# Patient Record
Sex: MALE | URBAN - METROPOLITAN AREA
[De-identification: names, ages, dates, MRNs, and addresses within clinical notes are randomized per-mention and may not be internally consistent; named-entity substitution may affect disease eponyms.]

---

## 2018-02-13 ENCOUNTER — APPOINTMENT (RX ONLY)
Dept: URBAN - METROPOLITAN AREA CLINIC 38 | Facility: CLINIC | Age: 63
Setting detail: DERMATOLOGY
End: 2018-02-13

## 2018-02-13 DIAGNOSIS — L82.1 OTHER SEBORRHEIC KERATOSIS: ICD-10-CM

## 2018-02-13 PROBLEM — H91.90 UNSPECIFIED HEARING LOSS, UNSPECIFIED EAR: Status: ACTIVE | Noted: 2018-02-13

## 2018-02-13 PROBLEM — F41.9 ANXIETY DISORDER, UNSPECIFIED: Status: ACTIVE | Noted: 2018-02-13

## 2018-02-13 PROBLEM — I10 ESSENTIAL (PRIMARY) HYPERTENSION: Status: ACTIVE | Noted: 2018-02-13

## 2018-02-13 PROBLEM — J44.9 CHRONIC OBSTRUCTIVE PULMONARY DISEASE, UNSPECIFIED: Status: ACTIVE | Noted: 2018-02-13

## 2018-02-13 PROBLEM — F32.9 MAJOR DEPRESSIVE DISORDER, SINGLE EPISODE, UNSPECIFIED: Status: ACTIVE | Noted: 2018-02-13

## 2018-02-13 PROBLEM — K21.9 GASTRO-ESOPHAGEAL REFLUX DISEASE WITHOUT ESOPHAGITIS: Status: ACTIVE | Noted: 2018-02-13

## 2018-02-13 PROCEDURE — 99202 OFFICE O/P NEW SF 15 MIN: CPT

## 2018-02-13 NOTE — HPI: SKIN LESION
How Severe Is Your Skin Lesion?: mild
Has Your Skin Lesion Been Treated?: not been treated
Is This A New Presentation, Or A Follow-Up?: Skin Lesion
Additional History: He put Neosporin and a bandaid on it.

## 2018-07-06 ENCOUNTER — NEW PATIENT (OUTPATIENT)
Age: 63
End: 2018-07-06

## 2018-07-06 DIAGNOSIS — B00.52: ICD-10-CM

## 2018-07-06 DIAGNOSIS — H53.032: ICD-10-CM

## 2018-07-06 DIAGNOSIS — Z98.890: ICD-10-CM

## 2018-07-06 DIAGNOSIS — H25.13: ICD-10-CM

## 2018-07-06 PROCEDURE — 99204 OFFICE O/P NEW MOD 45 MIN: CPT

## 2018-07-06 ASSESSMENT — TONOMETRY
OS_IOP_MMHG: 12
OD_IOP_MMHG: 11

## 2018-07-06 ASSESSMENT — KERATOMETRY
OS_K2POWER_DIOPTERS: 48.25
OD_AXISANGLE_DEGREES: 48
OD_AXISANGLE2_DEGREES: 138
OD_K1POWER_DIOPTERS: 42.50
OS_AXISANGLE_DEGREES: 178
OS_AXISANGLE2_DEGREES: 088
OD_K2POWER_DIOPTERS: 43.00
OS_K1POWER_DIOPTERS: 44.25

## 2018-07-06 ASSESSMENT — VISUAL ACUITY
OS_CC: 20/80+2
OD_CC: 20/100-1
OS_PH: 20/60-1
OD_PH: 20/50-1

## 2018-07-12 ENCOUNTER — DIAGNOSTICS ONLY (OUTPATIENT)
Age: 63
End: 2018-07-12

## 2018-07-12 DIAGNOSIS — H25.13: ICD-10-CM

## 2018-07-12 PROCEDURE — 92136 OPHTHALMIC BIOMETRY: CPT

## 2018-07-12 ASSESSMENT — KERATOMETRY
OS_AXISANGLE_DEGREES: 178
OS_AXISANGLE2_DEGREES: 088
OD_K2POWER_DIOPTERS: 43.00
OD_AXISANGLE2_DEGREES: 138
OS_K1POWER_DIOPTERS: 44.25
OD_K1POWER_DIOPTERS: 42.50
OD_AXISANGLE_DEGREES: 48
OS_K2POWER_DIOPTERS: 48.25

## 2018-08-13 ASSESSMENT — KERATOMETRY
OD_K1POWER_DIOPTERS: 42.50
OD_K2POWER_DIOPTERS: 43.00
OS_K2POWER_DIOPTERS: 48.25
OD_AXISANGLE_DEGREES: 48
OS_AXISANGLE2_DEGREES: 088
OS_K1POWER_DIOPTERS: 44.25
OD_AXISANGLE2_DEGREES: 138
OS_AXISANGLE_DEGREES: 178

## 2018-08-14 ENCOUNTER — SURGERY/PROCEDURE (OUTPATIENT)
Age: 63
End: 2018-08-14

## 2018-08-14 DIAGNOSIS — H25.812: ICD-10-CM

## 2018-08-14 PROCEDURE — 66984 XCAPSL CTRC RMVL W/O ECP: CPT

## 2018-08-15 ENCOUNTER — POST-OP CHECK (OUTPATIENT)
Age: 63
End: 2018-08-15

## 2018-08-15 DIAGNOSIS — Z96.1: ICD-10-CM

## 2018-08-15 PROCEDURE — 99024 POSTOP FOLLOW-UP VISIT: CPT

## 2018-08-15 ASSESSMENT — KERATOMETRY
OS_AXISANGLE2_DEGREES: 088
OD_K2POWER_DIOPTERS: 43.00
OD_AXISANGLE_DEGREES: 48
OS_K2POWER_DIOPTERS: 48.25
OS_AXISANGLE_DEGREES: 178
OS_K1POWER_DIOPTERS: 44.25
OD_AXISANGLE2_DEGREES: 138
OD_K1POWER_DIOPTERS: 42.50

## 2018-08-15 ASSESSMENT — TONOMETRY: OS_IOP_MMHG: 12

## 2018-08-15 ASSESSMENT — VISUAL ACUITY: OS_SC: 20/40-1

## 2018-08-16 ENCOUNTER — POST-OP CHECK (OUTPATIENT)
Age: 63
End: 2018-08-16

## 2018-08-16 DIAGNOSIS — Z96.1: ICD-10-CM

## 2018-08-16 PROCEDURE — 99024 POSTOP FOLLOW-UP VISIT: CPT

## 2018-08-16 ASSESSMENT — KERATOMETRY
OS_K1POWER_DIOPTERS: 44.25
OD_K1POWER_DIOPTERS: 42.50
OS_AXISANGLE2_DEGREES: 088
OS_AXISANGLE_DEGREES: 178
OD_AXISANGLE2_DEGREES: 138
OD_K2POWER_DIOPTERS: 43.00
OD_AXISANGLE_DEGREES: 48
OS_K2POWER_DIOPTERS: 48.25

## 2018-08-16 ASSESSMENT — VISUAL ACUITY
OS_SC: 20/40-2
OS_PH: 20/25-2

## 2018-08-16 ASSESSMENT — TONOMETRY: OS_IOP_MMHG: 10

## 2018-08-23 ENCOUNTER — POST-OP CHECK (OUTPATIENT)
Age: 63
End: 2018-08-23

## 2018-08-23 DIAGNOSIS — H25.11: ICD-10-CM

## 2018-08-23 DIAGNOSIS — Z96.1: ICD-10-CM

## 2018-08-23 PROCEDURE — 99024 POSTOP FOLLOW-UP VISIT: CPT

## 2018-08-23 PROCEDURE — 92136 OPHTHALMIC BIOMETRY: CPT | Mod: 26,RT

## 2018-08-23 ASSESSMENT — KERATOMETRY
OS_AXISANGLE2_DEGREES: 091
OD_AXISANGLE_DEGREES: 30
OS_K2POWER_DIOPTERS: 47.25
OD_K1POWER_DIOPTERS: 42.50
OD_K2POWER_DIOPTERS: 43.00
OS_K1POWER_DIOPTERS: 44.00
OS_AXISANGLE_DEGREES: 1
OD_AXISANGLE2_DEGREES: 120

## 2018-08-23 ASSESSMENT — TONOMETRY: OS_IOP_MMHG: 16

## 2018-08-23 ASSESSMENT — VISUAL ACUITY: OS_SC: 20/40

## 2018-09-11 ENCOUNTER — SURGERY/PROCEDURE (OUTPATIENT)
Age: 63
End: 2018-09-11

## 2018-09-11 DIAGNOSIS — H25.811: ICD-10-CM

## 2018-09-11 PROCEDURE — 66984 XCAPSL CTRC RMVL W/O ECP: CPT | Mod: 79,RT

## 2018-09-11 ASSESSMENT — KERATOMETRY
OS_AXISANGLE2_DEGREES: 091
OS_K2POWER_DIOPTERS: 47.25
OD_AXISANGLE2_DEGREES: 120
OD_AXISANGLE_DEGREES: 30
OS_K1POWER_DIOPTERS: 44.00
OS_AXISANGLE_DEGREES: 1
OD_K2POWER_DIOPTERS: 43.00
OD_K1POWER_DIOPTERS: 42.50

## 2018-09-12 ENCOUNTER — POST-OP CHECK (OUTPATIENT)
Age: 63
End: 2018-09-12

## 2018-09-12 DIAGNOSIS — Z96.1: ICD-10-CM

## 2018-09-12 PROCEDURE — 99024 POSTOP FOLLOW-UP VISIT: CPT

## 2018-09-12 ASSESSMENT — VISUAL ACUITY
OD_SC: 20/40+2
OS_SC: 20/30

## 2018-09-12 ASSESSMENT — KERATOMETRY
OD_K2POWER_DIOPTERS: 43.00
OD_K1POWER_DIOPTERS: 42.50
OS_AXISANGLE_DEGREES: 1
OS_K2POWER_DIOPTERS: 47.25
OS_AXISANGLE2_DEGREES: 091
OS_K1POWER_DIOPTERS: 44.00
OD_AXISANGLE2_DEGREES: 120
OD_AXISANGLE_DEGREES: 30

## 2018-09-12 ASSESSMENT — TONOMETRY: OD_IOP_MMHG: 11

## 2018-09-20 ENCOUNTER — POST-OP CHECK (OUTPATIENT)
Age: 63
End: 2018-09-20

## 2018-09-20 DIAGNOSIS — Z96.1: ICD-10-CM

## 2018-09-20 PROCEDURE — 99024 POSTOP FOLLOW-UP VISIT: CPT

## 2018-09-20 ASSESSMENT — KERATOMETRY
OD_K1POWER_DIOPTERS: 42.50
OS_K1POWER_DIOPTERS: 44.00
OS_AXISANGLE_DEGREES: 1
OS_AXISANGLE2_DEGREES: 091
OD_AXISANGLE2_DEGREES: 120
OS_K2POWER_DIOPTERS: 47.25
OD_K2POWER_DIOPTERS: 43.00
OD_AXISANGLE_DEGREES: 30

## 2018-09-20 ASSESSMENT — TONOMETRY
OS_IOP_MMHG: 09
OD_IOP_MMHG: 13

## 2018-09-20 ASSESSMENT — VISUAL ACUITY
OS_SC: 20/30
OD_SC: 20/20

## 2018-10-05 ENCOUNTER — POST-OP CHECK (OUTPATIENT)
Age: 63
End: 2018-10-05

## 2018-10-05 DIAGNOSIS — Z96.1: ICD-10-CM

## 2018-10-05 PROCEDURE — 99024 POSTOP FOLLOW-UP VISIT: CPT

## 2018-10-05 ASSESSMENT — KERATOMETRY
OS_K2POWER_DIOPTERS: 47.25
OD_K2POWER_DIOPTERS: 43.00
OS_AXISANGLE_DEGREES: 1
OD_K1POWER_DIOPTERS: 42.50
OS_AXISANGLE2_DEGREES: 91
OD_AXISANGLE2_DEGREES: 127
OD_AXISANGLE_DEGREES: 37
OS_K1POWER_DIOPTERS: 44.00

## 2018-10-05 ASSESSMENT — TONOMETRY
OD_IOP_MMHG: 06
OS_IOP_MMHG: 07

## 2018-10-05 ASSESSMENT — VISUAL ACUITY
OD_SC: 20/20
OS_SC: 20/30+2

## 2019-04-12 ENCOUNTER — DILATED FUNDUS EXAM (OUTPATIENT)
Age: 64
End: 2019-04-12

## 2019-04-12 DIAGNOSIS — Z96.1: ICD-10-CM

## 2019-04-12 DIAGNOSIS — H43.393: ICD-10-CM

## 2019-04-12 PROCEDURE — 99214 OFFICE O/P EST MOD 30 MIN: CPT

## 2019-04-12 ASSESSMENT — TONOMETRY
OD_IOP_MMHG: 10
OS_IOP_MMHG: 09

## 2019-04-12 ASSESSMENT — KERATOMETRY
OD_AXISANGLE2_DEGREES: 127
OD_K2POWER_DIOPTERS: 43.00
OS_K1POWER_DIOPTERS: 44.00
OD_K1POWER_DIOPTERS: 42.50
OD_AXISANGLE_DEGREES: 37
OS_AXISANGLE2_DEGREES: 91
OS_K2POWER_DIOPTERS: 47.25
OS_AXISANGLE_DEGREES: 1

## 2019-04-12 ASSESSMENT — VISUAL ACUITY
OD_SC: 20/15-2
OS_SC: 20/30

## 2019-06-21 ENCOUNTER — INTERMEDIATE (OUTPATIENT)
Age: 64
End: 2019-06-21

## 2019-06-21 DIAGNOSIS — H43.393: ICD-10-CM

## 2019-06-21 DIAGNOSIS — Z96.1: ICD-10-CM

## 2019-06-21 DIAGNOSIS — H43.811: ICD-10-CM

## 2019-06-21 PROCEDURE — 99214 OFFICE O/P EST MOD 30 MIN: CPT

## 2019-06-21 ASSESSMENT — VISUAL ACUITY
OD_SC: 20/15-1
OS_PH: 20/20
OS_SC: 20/50

## 2019-06-21 ASSESSMENT — TONOMETRY
OD_IOP_MMHG: 10
OS_IOP_MMHG: 10

## 2023-09-29 ENCOUNTER — NEW PATIENT COMPREHENSIVE (OUTPATIENT)
Dept: URBAN - METROPOLITAN AREA CLINIC 71 | Facility: CLINIC | Age: 68
End: 2023-09-29

## 2023-09-29 DIAGNOSIS — Z96.1: ICD-10-CM

## 2023-09-29 DIAGNOSIS — H43.393: ICD-10-CM

## 2023-09-29 DIAGNOSIS — H43.813: ICD-10-CM

## 2023-09-29 PROCEDURE — 92004 COMPRE OPH EXAM NEW PT 1/>: CPT

## 2023-09-29 ASSESSMENT — VISUAL ACUITY
OS_SC: 20/25
OD_SC: 20/15-1

## 2023-10-19 ENCOUNTER — FOLLOW UP (OUTPATIENT)
Dept: URBAN - METROPOLITAN AREA CLINIC 71 | Facility: CLINIC | Age: 68
End: 2023-10-19

## 2023-10-19 DIAGNOSIS — H43.813: ICD-10-CM

## 2023-10-19 DIAGNOSIS — H43.393: ICD-10-CM

## 2023-10-19 PROCEDURE — 99213 OFFICE O/P EST LOW 20 MIN: CPT

## 2023-10-19 ASSESSMENT — VISUAL ACUITY
OS_SC: 20/20
OD_SC: 20/20

## 2024-10-17 ENCOUNTER — ESTABLISHED COMPREHENSIVE EXAM (OUTPATIENT)
Dept: URBAN - METROPOLITAN AREA CLINIC 71 | Facility: CLINIC | Age: 69
End: 2024-10-17

## 2024-10-17 DIAGNOSIS — H43.813: ICD-10-CM

## 2024-10-17 DIAGNOSIS — H43.393: ICD-10-CM

## 2024-10-17 DIAGNOSIS — Z96.1: ICD-10-CM

## 2024-10-17 PROCEDURE — 92014 COMPRE OPH EXAM EST PT 1/>: CPT

## 2024-10-17 ASSESSMENT — VISUAL ACUITY
OD_SC: 20/20
OS_SC: 20/30+1

## (undated) RX ORDER — BROMFENAC SODIUM 0.7 MG/ML: 1 SOLUTION/ DROPS OPHTHALMIC ONCE A DAY

## (undated) RX ORDER — PREDNISOLONE ACETATE 10 MG/ML: 1 SUSPENSION/ DROPS OPHTHALMIC

## (undated) RX ORDER — OFLOXACIN 3 MG/ML: 1 SOLUTION OPHTHALMIC

## (undated) RX ORDER — PREDNISOLONE ACETATE 10 MG/ML: 1 SUSPENSION/ DROPS OPHTHALMIC TWICE A DAY